# Patient Record
Sex: FEMALE | Race: WHITE | NOT HISPANIC OR LATINO | Employment: FULL TIME | URBAN - METROPOLITAN AREA
[De-identification: names, ages, dates, MRNs, and addresses within clinical notes are randomized per-mention and may not be internally consistent; named-entity substitution may affect disease eponyms.]

---

## 2018-01-13 NOTE — MISCELLANEOUS
Message   Recorded as Task   Date: 04/15/2016 03:14 PM, Created By: Antonette Huber   Task Name: Follow Up   Assigned To: Susan Cloud   Regarding Patient: Johann Hilton, Status: In Progress   January Velascoll - 15 Apr 2016 3:14 PM     TASK CREATED  Caller: Self; [phone] (Home); [phone] (Work)  pt looking for us results call [phone] - 15 Apr 2016 3:21 PM     TASK IN PROGRESS        Active Problems    1  Diabetes Mellitus (250 00)   2  Encounter for gynecological examination with abnormal finding (V72 31) (Z01 411)   3  Encounter for routine gynecological examination with Papanicolaou smear of cervix   (V72 31,V76 2) (Z01 419)   4  Mass of left axilla (782 2) (R22 32)   5  Screening for HPV (human papillomavirus) (V73 81) (Z11 51)    Current Meds   1  HumaLOG 100 UNIT/ML Subcutaneous Solution; Therapy: [id number] to (Last Pramod Max)  Requested for: 26Apr2011 Ordered   2  Nova Max Glucose Test In [employer]; Therapy: [id number] to (Last Harvey Castillo)  Requested for: 21Jun2011 Ordered   3  Ramipril 1 25 MG Oral Capsule; Therapy: (Recorded:05Apr2016) to Recorded    Allergies    1   No Known Drug Allergies    Signatures   Electronically signed by : Jeff Pineda RN; Apr 15 2016  3:23PM EST                       (Author)

## 2018-01-15 NOTE — MISCELLANEOUS
Message   Recorded as Task   Date: 04/19/2016 08:18 AM, Created By: Karen Rooney   Task Name: Call Back   Assigned To: Donn Villavicencio   Regarding Patient: Bairon Blanton, Status: Active   Comment:    Karen Rooney - 19 Apr 2016 8:18 AM     TASK CREATED  Caller: Self; Results Inquiry; [phone] (Mobile Phone)  pt called - had us last wednesday for lump under arm - looking for results - ok to leave message if you get voice mail - please advise  [phone]   Karen Rooney - 19 Apr 2016 8:38 AM     TASK REASSIGNED: Previously Assigned To Jorgeelke Bella - 19 Apr 2016 8:41 AM     TASK EDITED  pt req result review and further instructions re 04/13 u/s  to sujey        Active Problems    1  Diabetes Mellitus (250 00)   2  Encounter for gynecological examination with abnormal finding (V72 31) (Z01 411)   3  Encounter for routine gynecological examination with Papanicolaou smear of cervix   (V72 31,V76 2) (Z01 419)   4  Mass of left axilla (782 2) (R22 32)   5  Screening for HPV (human papillomavirus) (V73 81) (Z11 51)    Current Meds   1  HumaLOG 100 UNIT/ML Subcutaneous Solution; Therapy: [id number] to (Last Margaret Brooklyn)  Requested for: 26Apr2011 Ordered   2  Nova Max Glucose Test In [employer]; Therapy: 39YVC1473 to (Last [facility])  Requested for: 21Jun2011 Ordered   3  Ramipril 1 25 MG Oral Capsule; Therapy: (Recorded:05Apr2016) to Recorded    Allergies    1   No Known Drug Allergies    Signatures   Electronically signed by : Rojas Vera, ; Apr 19 2016  8:41AM EST                       (Author)

## 2018-01-18 NOTE — MISCELLANEOUS
Message   Recorded as Task   Date: 04/15/2016 03:14 PM, Created By: Royer Perry   Task Name: Follow Up   Assigned To: Chloé Dong   Regarding Patient: Vik Ge, Status: In Progress   AzraNancy Guanacopers - 15 Apr 2016 3:14 PM     TASK CREATED  Caller: Self; [phone] (Home); [phone] (Work)  pt looking for us results call [phone] - 15 Apr 2016 3:21 PM     TASK IN [facility] 85 - 15 Apr 2016 3:23 PM     TASK EDITED  pt requesting results after further review from [address] Problems    1  Diabetes Mellitus (250 00)   2  Encounter for gynecological examination with abnormal finding (V72 31) (Z01 411)   3  Encounter for routine gynecological examination with Papanicolaou smear of cervix   (V72 31,V76 2) (Z01 419)   4  Mass of left axilla (782 2) (R22 32)   5  Screening for HPV (human papillomavirus) (V73 81) (Z11 51)    Current Meds   1  HumaLOG 100 UNIT/ML Subcutaneous Solution; Therapy: [id number] to (Last Jada Carver)  Requested for: 26Apr2011 Ordered   2  Nova Max Glucose Test In [employer]; Therapy: [id number] to (Last Elena Kim)  Requested for: 21Jun2011 Ordered   3  Ramipril 1 25 MG Oral Capsule; Therapy: (Recorded:05Apr2016) to Recorded    Allergies    1   No Known Drug Allergies    Signatures   Electronically signed by : Kimmy Robin RN; Apr 15 2016  3:23PM EST                       (Author)

## 2019-02-14 ENCOUNTER — TELEPHONE (OUTPATIENT)
Dept: OBGYN CLINIC | Facility: CLINIC | Age: 39
End: 2019-02-14

## 2019-02-14 NOTE — TELEPHONE ENCOUNTER
Pt said she got a lump in groin - size of small grape  She said it opened and is draining  Pt was here within last 3 years  I told her to soak area - warm compresses till appt  To arrive 15 min prior  Lump formed on Tues

## 2019-02-14 NOTE — TELEPHONE ENCOUNTER
Patient states she has a very painful cyst that recently ruptured and is draining pus  Patient said it feels very deep almost like its onto the muscle

## 2019-02-15 ENCOUNTER — OFFICE VISIT (OUTPATIENT)
Dept: OBGYN CLINIC | Age: 39
End: 2019-02-15
Payer: COMMERCIAL

## 2019-02-15 VITALS
BODY MASS INDEX: 28.48 KG/M2 | WEIGHT: 166.8 LBS | DIASTOLIC BLOOD PRESSURE: 76 MMHG | HEIGHT: 64 IN | SYSTOLIC BLOOD PRESSURE: 126 MMHG

## 2019-02-15 DIAGNOSIS — N76.4 LABIAL ABSCESS: Primary | ICD-10-CM

## 2019-02-15 PROCEDURE — 99213 OFFICE O/P EST LOW 20 MIN: CPT | Performed by: NURSE PRACTITIONER

## 2019-02-15 RX ORDER — PEN NEEDLE, DIABETIC 29 G X1/2"
NEEDLE, DISPOSABLE MISCELLANEOUS
COMMUNITY
Start: 2016-01-15

## 2019-02-15 RX ORDER — DOXYCYCLINE 100 MG/1
100 TABLET ORAL 2 TIMES DAILY
Qty: 14 TABLET | Refills: 0 | Status: SHIPPED | OUTPATIENT
Start: 2019-02-15 | End: 2019-02-22

## 2019-02-15 NOTE — PROGRESS NOTES
Assessment/Plan:      Diagnoses and all orders for this visit:    1  Labial abscess  -     doxycycline (ADOXA) 100 MG tablet; Take 1 tablet (100 mg total) by mouth 2 (two) times a day for 7 days    Reviewed using warm compresses 2-3 times a day for about 15 minutes  Also start abx and RTO in 1 week to reevaluate, may need draining  If fever develops, then go to ER  All questions and concerns answered  Call with any concerns  Subjective:     Patient ID: King Espinoza is a 43 y o  female  HPI  Pleasant 43 y o female presents today with c/o perineal lump that she found at the beginning of this week and then started to drain and became tender  She denies F/C, states that this has occurred before, but was not a large and went away on it's own  She applied neosporin after it started to drain and it did provide some help  Review of Systems   Constitutional: Negative for chills, fatigue and fever  Gastrointestinal: Negative for constipation, diarrhea, nausea and vomiting  Genitourinary: Positive for genital sores  Negative for vaginal bleeding, vaginal discharge and vaginal pain  Skin: Positive for rash (on left groin, 1-2 cm in width, yellow tissue and discharge, tender)  Objective:     Physical Exam   Constitutional: She is oriented to person, place, and time  She appears well-developed and well-nourished  Eyes: Pupils are equal, round, and reactive to light  Conjunctivae and EOM are normal    Cardiovascular: Normal rate and regular rhythm  Genitourinary: Uterus normal        There is no rash, tenderness, lesion or injury on the right labia  There is tenderness and lesion on the left labia  There is no rash or injury on the left labia  Neurological: She is alert and oriented to person, place, and time  Skin: Skin is warm and dry  Capillary refill takes less than 2 seconds  Rash noted  Psychiatric: She has a normal mood and affect   Her behavior is normal  Judgment and thought content normal    Nursing note and vitals reviewed

## 2019-02-15 NOTE — PATIENT INSTRUCTIONS
Bartholin Cyst   WHAT YOU NEED TO KNOW:   What is a Bartholin cyst?  A Bartholin cyst is a lump near the opening to your vagina  You may have pain in this area when you walk or have sex  A Bartholin cyst is caused by blockage of your Bartholin gland  You have a Bartholin gland on each side of your vagina  The glands produce fluid to moisten your vagina  Over time the fluid can build up in the gland and form a cyst  The cyst may become infected  You may be at risk for a Bartholin cyst if you have a sexually transmitted infection  An injury or surgery near your vagina may also increase you risk  How is a Bartholin cyst diagnosed and treated? Your healthcare provider will examine your vagina  A sample of fluid from your vagina may be collected  The fluid can be tested for sexually transmitted infections  Your healthcare provider may tell you how to treat your cyst at home  Instead, you may need any of the following:  · Medicine  may be given to treat or prevent an infection  Medicine may also be given to decrease pain and swelling  · Incision and drainage  is a procedure to drain the cyst  Your healthcare provider will make an opening in the cyst so it can drain  He or she may also place packing or a drain in your wound  The drain will help keep your gland open and drain extra fluid  The packing will be removed in 24 to 48 hours  The drain may be left in place for 4 to 6 weeks  · Surgery  may be needed if other treatments do not work  Surgery may be done to hold your gland open and prevent another blockage  Surgery may also be done to remove 1 or both of your Bartholin glands  What can I do to care for myself if my cyst is not drained? · Take a sitz bath  3 to 4 times each day or as directed  A sitz bath may help relieve swelling and pain  It will also help open your Bartholin glands so they drain normally  Place a clean towel on the bottom of your bath tub   Fill your bath tub with warm water up to your hips  You can also buy a sitz bath that fits in your toilet  Sit in the water for 10 minutes  · Apply a warm compress  to your cyst  This may relieve swelling and pain  A warm compress will also help open your Bartholin glands so they drain normally  Wet a washcloth in warm, but not hot, water  Apply the compress for 10 minutes  Repeat 4 times each day  · Keep the area around your vagina clean  Always wipe front to back  Shower once a day  Gently pat the area dry after a shower  What can I do to care for myself after my cyst is drained? · Take a sitz bath  in 24 to 48 hours or as directed  You may need to wait to take a sitz bath until after your packing is removed  A sitz bath may help relieve swelling and pain  Take a sitz bath 3 to 4 times each day for 3 days  Place a clean towel on the bottom of your bath tub  Fill your bath tub with warm water up to your hips  You can also buy a sitz bath that fits in your toilet  Sit in the water for 10 minutes  · Wear a sanitary pad  to absorb drainage from your wound  You may have drainage for a few weeks after your cyst is drained  · Ask your healthcare provider if it is okay to have sex  Sex may cause your drain to fall out  It may also increase your risk for an infection  · Keep the area around your vagina clean  Always wipe front to back  Shower once a day  Gently pat the area dry after a shower  When should I contact my gynecologist or healthcare provider? · You have a fever  · Your cyst gets larger or becomes more painful  · Your cyst returns after treatment  · Your drain falls out  · You have pus, redness, or swelling where the cyst was drained  · You have questions or concerns about your condition or care  CARE AGREEMENT:   You have the right to help plan your care  Learn about your health condition and how it may be treated  Discuss treatment options with your caregivers to decide what care you want to receive   You always have the right to refuse treatment  The above information is an  only  It is not intended as medical advice for individual conditions or treatments  Talk to your doctor, nurse or pharmacist before following any medical regimen to see if it is safe and effective for you  © 2017 [employer] Information is for End User's use only and may not be sold, redistributed or otherwise used for commercial purposes  All illustrations and images included in CareNotes® are the copyrighted property of [employer]  or [employer]

## 2019-04-19 ENCOUNTER — APPOINTMENT (OUTPATIENT)
Dept: LAB | Facility: MEDICAL CENTER | Age: 39
End: 2019-04-19
Payer: COMMERCIAL

## 2019-04-19 ENCOUNTER — TRANSCRIBE ORDERS (OUTPATIENT)
Dept: ADMINISTRATIVE | Facility: HOSPITAL | Age: 39
End: 2019-04-19

## 2019-04-19 DIAGNOSIS — E10.65 UNCONTROLLED TYPE 1 DIABETES MELLITUS WITH HYPERGLYCEMIA (HCC): Primary | ICD-10-CM

## 2019-04-19 LAB
EST. AVERAGE GLUCOSE BLD GHB EST-MCNC: 246 MG/DL
HBA1C MFR BLD: 10.2 % (ref 4.2–6.3)

## 2019-04-19 PROCEDURE — 36415 COLL VENOUS BLD VENIPUNCTURE: CPT | Performed by: INTERNAL MEDICINE

## 2019-04-19 PROCEDURE — 83036 HEMOGLOBIN GLYCOSYLATED A1C: CPT | Performed by: INTERNAL MEDICINE

## 2020-12-09 ENCOUNTER — NURSE TRIAGE (OUTPATIENT)
Dept: OTHER | Facility: OTHER | Age: 40
End: 2020-12-09

## 2020-12-09 DIAGNOSIS — Z20.822 SUSPECTED COVID-19 VIRUS INFECTION: Primary | ICD-10-CM

## 2020-12-09 DIAGNOSIS — Z20.822 SUSPECTED COVID-19 VIRUS INFECTION: ICD-10-CM

## 2020-12-09 PROCEDURE — U0003 INFECTIOUS AGENT DETECTION BY NUCLEIC ACID (DNA OR RNA); SEVERE ACUTE RESPIRATORY SYNDROME CORONAVIRUS 2 (SARS-COV-2) (CORONAVIRUS DISEASE [COVID-19]), AMPLIFIED PROBE TECHNIQUE, MAKING USE OF HIGH THROUGHPUT TECHNOLOGIES AS DESCRIBED BY CMS-2020-01-R: HCPCS | Performed by: FAMILY MEDICINE

## 2020-12-10 LAB — SARS-COV-2 RNA SPEC QL NAA+PROBE: NOT DETECTED
